# Patient Record
Sex: FEMALE
[De-identification: names, ages, dates, MRNs, and addresses within clinical notes are randomized per-mention and may not be internally consistent; named-entity substitution may affect disease eponyms.]

---

## 2024-06-20 ENCOUNTER — NON-APPOINTMENT (OUTPATIENT)
Age: 74
End: 2024-06-20

## 2024-06-20 PROBLEM — Z00.00 ENCOUNTER FOR PREVENTIVE HEALTH EXAMINATION: Status: ACTIVE | Noted: 2024-06-20

## 2024-06-24 PROBLEM — D05.11 DUCTAL CARCINOMA IN SITU (DCIS) OF RIGHT BREAST: Status: ACTIVE | Noted: 2024-06-24

## 2024-06-25 ENCOUNTER — NON-APPOINTMENT (OUTPATIENT)
Age: 74
End: 2024-06-25

## 2024-06-25 ENCOUNTER — APPOINTMENT (OUTPATIENT)
Dept: BREAST CENTER | Facility: CLINIC | Age: 74
End: 2024-06-25
Payer: MEDICARE

## 2024-06-25 VITALS
BODY MASS INDEX: 49.51 KG/M2 | HEART RATE: 68 BPM | DIASTOLIC BLOOD PRESSURE: 92 MMHG | HEIGHT: 64.17 IN | SYSTOLIC BLOOD PRESSURE: 157 MMHG | WEIGHT: 290 LBS

## 2024-06-25 DIAGNOSIS — Z86.79 PERSONAL HISTORY OF OTHER DISEASES OF THE CIRCULATORY SYSTEM: ICD-10-CM

## 2024-06-25 DIAGNOSIS — D05.11 INTRADUCTAL CARCINOMA IN SITU OF RIGHT BREAST: ICD-10-CM

## 2024-06-25 DIAGNOSIS — Z86.39 PERSONAL HISTORY OF OTHER ENDOCRINE, NUTRITIONAL AND METABOLIC DISEASE: ICD-10-CM

## 2024-06-25 DIAGNOSIS — Z80.0 FAMILY HISTORY OF MALIGNANT NEOPLASM OF DIGESTIVE ORGANS: ICD-10-CM

## 2024-06-25 PROCEDURE — 99205 OFFICE O/P NEW HI 60 MIN: CPT

## 2024-06-25 RX ORDER — ROSUVASTATIN CALCIUM 5 MG/1
TABLET, FILM COATED ORAL
Refills: 0 | Status: ACTIVE | COMMUNITY

## 2024-06-25 RX ORDER — METOPROLOL TARTRATE 75 MG/1
TABLET, FILM COATED ORAL
Refills: 0 | Status: ACTIVE | COMMUNITY

## 2024-06-28 ENCOUNTER — OUTPATIENT (OUTPATIENT)
Dept: OUTPATIENT SERVICES | Facility: HOSPITAL | Age: 74
LOS: 1 days | End: 2024-06-28
Payer: MEDICARE

## 2024-06-28 ENCOUNTER — RESULT REVIEW (OUTPATIENT)
Age: 74
End: 2024-06-28

## 2024-06-28 DIAGNOSIS — D05.11 INTRADUCTAL CARCINOMA IN SITU OF RIGHT BREAST: ICD-10-CM

## 2024-06-28 LAB — SURGICAL PATHOLOGY STUDY: SIGNIFICANT CHANGE UP

## 2024-06-28 PROCEDURE — 88321 CONSLTJ&REPRT SLD PREP ELSWR: CPT

## 2024-07-01 ENCOUNTER — NON-APPOINTMENT (OUTPATIENT)
Age: 74
End: 2024-07-01

## 2024-07-12 ENCOUNTER — NON-APPOINTMENT (OUTPATIENT)
Age: 74
End: 2024-07-12

## 2024-09-04 ENCOUNTER — TRANSCRIPTION ENCOUNTER (OUTPATIENT)
Age: 74
End: 2024-09-04

## 2024-09-04 VITALS
TEMPERATURE: 98 F | HEART RATE: 67 BPM | HEIGHT: 64 IN | OXYGEN SATURATION: 97 % | SYSTOLIC BLOOD PRESSURE: 161 MMHG | WEIGHT: 293 LBS | DIASTOLIC BLOOD PRESSURE: 89 MMHG | RESPIRATION RATE: 18 BRPM

## 2024-09-04 RX ORDER — ROSUVASTATIN CALCIUM 10 MG/1
1 TABLET ORAL
Refills: 0 | DISCHARGE

## 2024-09-04 RX ORDER — ASPIRIN 81 MG
0 TABLET, DELAYED RELEASE (ENTERIC COATED) ORAL
Refills: 0 | DISCHARGE

## 2024-09-04 NOTE — ASU PATIENT PROFILE, ADULT - FALL HARM RISK - HARM RISK INTERVENTIONS

## 2024-09-04 NOTE — ASU PATIENT PROFILE, ADULT - HISTORY OF COVID-19 VACCINATION
April 3, 2023      Fadi Calzada  [address]  [facility] [address]      Dear Fadi,      To help you live well and offer other ways to take care of your health care needs, your doctor’s practice in partnership with [facility], is offering a new program that will help you stay healthy. The program is called Comprehensive Annual Visits. Here’s why you should schedule your visit today:    Your health and wellness have never been more important! Even if you already had a visit with your doctor, this visit offers key benefits for YOU:    1. Convenience: Our Nurse Practitioner can do screenings in your home that you would have to go out to the clinic for. They will talk about important care for you and help you set health goals.     2. Health exam and vaccines:  The Nurse Practitioner will check your blood pressure, review your medications, give you needed vaccinations (flu, pneumonia & shingles) and complete preventive screenings.    3. Better manage your health and chronic conditions: The friendly Nurse Practitioner will review your medications, medical history, and talk about your health concerns and questions.    4. Take your time: The appointment is 45 minutes, allowing plenty of time to get to know your Nurse Practitioner, review your health information, and talk about your health goals.    5. Get the care you need: The Nurse Practitioner will share with us your health information, concerns, and priorities, so that we can help you get the care you need, when you need it.  Next steps    Please take advantage of this new program and reserve your spot! You will receive a call from a representative at [facility] or you can call to schedule an appointment at [phone], Monday - Friday, 8 a.m. ? 4 p.m. CST.  We can also answer any questions you may have about this appointment.      Be well!    From your doctor’s practice in partnership with [facility]  
Yes

## 2024-09-04 NOTE — ASU PATIENT PROFILE, ADULT - NSICDXPASTSURGICALHX_GEN_ALL_CORE_FT
PAST SURGICAL HISTORY:  History of bladder suspension procedure     History of hysterectomy partial

## 2024-09-04 NOTE — ASU PREOP CHECKLIST - LOOSE TEETH
Detail Level: Detailed
Add 92332 Cpt? (Important Note: In 2017 The Use Of 08389 Is Being Tracked By Cms To Determine Future Global Period Reimbursement For Global Periods): yes
no

## 2024-09-04 NOTE — ASU PATIENT PROFILE, ADULT - NSICDXPASTMEDICALHX_GEN_ALL_CORE_FT
PAST MEDICAL HISTORY:  Breast cancer right    HLD (hyperlipidemia)     HTN (hypertension)     Pneumonia 7/2024    Thyroid nodule

## 2024-09-05 ENCOUNTER — APPOINTMENT (OUTPATIENT)
Dept: BREAST CENTER | Facility: HOSPITAL | Age: 74
End: 2024-09-05

## 2024-09-05 ENCOUNTER — RESULT REVIEW (OUTPATIENT)
Age: 74
End: 2024-09-05

## 2024-09-05 ENCOUNTER — OUTPATIENT (OUTPATIENT)
Dept: OUTPATIENT SERVICES | Facility: HOSPITAL | Age: 74
LOS: 1 days | Discharge: HOME | End: 2024-09-05
Payer: MEDICARE

## 2024-09-05 VITALS
TEMPERATURE: 97 F | OXYGEN SATURATION: 97 % | DIASTOLIC BLOOD PRESSURE: 83 MMHG | RESPIRATION RATE: 18 BRPM | SYSTOLIC BLOOD PRESSURE: 146 MMHG | HEART RATE: 71 BPM

## 2024-09-05 DIAGNOSIS — Z98.890 OTHER SPECIFIED POSTPROCEDURAL STATES: Chronic | ICD-10-CM

## 2024-09-05 DIAGNOSIS — Z90.710 ACQUIRED ABSENCE OF BOTH CERVIX AND UTERUS: Chronic | ICD-10-CM

## 2024-09-05 LAB — GLUCOSE BLDC GLUCOMTR-MCNC: 114 MG/DL — HIGH (ref 70–99)

## 2024-09-05 PROCEDURE — 82962 GLUCOSE BLOOD TEST: CPT

## 2024-09-05 PROCEDURE — 19301 PARTIAL MASTECTOMY: CPT | Mod: RT

## 2024-09-05 PROCEDURE — 76098 X-RAY EXAM SURGICAL SPECIMEN: CPT | Mod: 26

## 2024-09-05 PROCEDURE — 88305 TISSUE EXAM BY PATHOLOGIST: CPT | Mod: 26

## 2024-09-05 PROCEDURE — 76098 X-RAY EXAM SURGICAL SPECIMEN: CPT

## 2024-09-05 PROCEDURE — 88307 TISSUE EXAM BY PATHOLOGIST: CPT

## 2024-09-05 PROCEDURE — 88305 TISSUE EXAM BY PATHOLOGIST: CPT

## 2024-09-05 PROCEDURE — 88307 TISSUE EXAM BY PATHOLOGIST: CPT | Mod: 26

## 2024-09-05 RX ORDER — OXYCODONE HYDROCHLORIDE 5 MG/1
5 TABLET ORAL ONCE
Refills: 0 | Status: DISCONTINUED | OUTPATIENT
Start: 2024-09-05 | End: 2024-09-05

## 2024-09-05 RX ORDER — HYDRALAZINE HCL 50 MG
5 TABLET ORAL ONCE
Refills: 0 | Status: COMPLETED | OUTPATIENT
Start: 2024-09-05 | End: 2024-09-05

## 2024-09-05 RX ORDER — ROSUVASTATIN CALCIUM 10 MG/1
1 TABLET ORAL
Refills: 0 | DISCHARGE

## 2024-09-05 RX ORDER — METOPROLOL TARTRATE 100 MG/1
1 TABLET ORAL
Refills: 0 | DISCHARGE

## 2024-09-05 RX ORDER — ACETAMINOPHEN 325 MG/1
1000 TABLET ORAL EVERY 6 HOURS
Refills: 0 | Status: DISCONTINUED | OUTPATIENT
Start: 2024-09-05 | End: 2024-09-05

## 2024-09-05 RX ADMIN — ACETAMINOPHEN 1000 MILLIGRAM(S): 325 TABLET ORAL at 12:33

## 2024-09-05 RX ADMIN — Medication 5 MILLIGRAM(S): at 11:54

## 2024-09-05 RX ADMIN — ACETAMINOPHEN 1000 MILLIGRAM(S): 325 TABLET ORAL at 12:44

## 2024-09-05 NOTE — PRE-ANESTHESIA EVALUATION ADULT - NSANTHOSAYNRD_GEN_A_CORE
No. SUMMER screening performed.  STOP BANG Legend: 0-2 = LOW Risk; 3-4 = INTERMEDIATE Risk; 5-8 = HIGH Risk tried twice in past 5 days

## 2024-09-05 NOTE — ASU DISCHARGE PLAN (ADULT/PEDIATRIC) - CARE PROVIDER_API CALL
Jovan Whitney Ponce  Surgery  [address] [place] [id number]  Phone: [phone]  Fax: [phone]  Follow Up Time:

## 2024-09-05 NOTE — ASU DISCHARGE PLAN (ADULT/PEDIATRIC) - ASU DC SPECIAL INSTRUCTIONSFT
Follow up with Dr. Whitney in 1-2 weeks. Call the office at the number below to schedule your appointment. You may shower after 2 days; soap and water over incision sites. Do not scrub. Pat dry when done. No tub bathing or swimming until cleared. Keep incision sites out of the sun as scars will darken. Ambulate as tolerated, but no heavy lifting (>10lbs) or strenuous exercise. You may resume regular diet. You should be urinating at least 3-4x per day. Call the office if you experience increasing abdominal pain, nausea, vomiting, or temperature >101 F.    Please keep ACE bandage compression on for 2 days. After 2 days you may remove ACE bandage, but do not remove Steri-strips.    Please take Tylenol or Motrin every six hours as need for pain.

## 2024-09-05 NOTE — PROGRESS NOTE ADULT - SUBJECTIVE AND OBJECTIVE BOX
Procedure: Right breast lumpectomy  Surgeon: Dr. Whitney    S: Pt has no complaints. Denies CP, SOB, GARCIA, calf tenderness. Pain controlled with medication.    O:  T(C): 36.1 (09-05-24 @ 09:58), Max: 36.1 (09-05-24 @ 09:58)  T(F): 97 (09-05-24 @ 09:58), Max: 97 (09-05-24 @ 09:58)  HR: 65 (09-05-24 @ 11:46) (57 - 65)  BP: 173/97 (09-05-24 @ 11:46) (158/79 - 174/81)  RR: 18 (09-05-24 @ 11:46) (15 - 24)  SpO2: 97% (09-05-24 @ 11:46) (92% - 98%)  Wt(kg): --      Physical exam:  Gen: NAD, resting comfortably in chair  C/V: NSR  Breast: bandage in place, no swelling, TTP, or overlying skin changes nearby  Pulm: Nonlabored breathing, no respiratory distress  Abd: soft, NT/ND  Extrem: WWP, no calf edema, SCDs in place

## 2024-09-05 NOTE — BRIEF OPERATIVE NOTE - NSICDXBRIEFPREOP_GEN_ALL_CORE_FT
PRE-OP DIAGNOSIS:  Ductal carcinoma in situ (DCIS) of right breast 05-Sep-2024 09:59:16  Alejo Fofana

## 2024-09-05 NOTE — PROGRESS NOTE ADULT - ASSESSMENT
A/P: 74yFemale s/p R breast lumpectomy. Recovering well post-op. Post-op check within normal limits.    dc home today  tylenol/advil for pain

## 2024-09-05 NOTE — BRIEF OPERATIVE NOTE - OPERATION/FINDINGS
Right periareolar incision. Subcutaneous tissue dissected with electrocautery. Magseed localizer utilized and specimen taken from with confirmation of both clips with intraop xray. Additional shave margins taken. Hemostasis achieved. Tissue rearrangement completed and tissue closed in layers with 2.0 vicryl, 4.0 vicryl, and 5.0 monocryl.

## 2024-09-05 NOTE — BRIEF OPERATIVE NOTE - NSICDXBRIEFPOSTOP_GEN_ALL_CORE_FT
POST-OP DIAGNOSIS:  Ductal carcinoma in situ (DCIS) of right breast 05-Sep-2024 09:59:20  Alejo Fofana

## 2024-09-08 NOTE — HISTORY OF PRESENT ILLNESS
[de-identified] : 74 year old Faith female was referred by Dr Whitney regarding newly diagnosed R DCIS , intermediate nuclear grade, cribriform pattern with calcifications; ER >90% CA >90%; malignant; concordant; seen as 0.8cm RIGHT breast 5:00 1cmfn mass initially seen on screening imaging; biopsied via US   4/17/24 ([facility]) B/L sMMG/US: scattered areas of fbg density. Right 6:00, retroareolar questioned mass for which diagnostic mammography and targeted sonography would be recommended. If older outside breast imaging is made available, comparison should be requested, which may obviate the need for additional imaging. FOLLOW-UP: Additional imaging. BI-RADS 0: Incomplete.  5/16/24 ([facility]) R DX MMG/US: scattered areas of fbg density. MAMMO: Right 5:00 to 6:00 2cmfn persistent 0.9 cm equal density mass. US: 5:00 1cmfn cystic mass containing intracystic solid material 0.8 x 0.5 x 0.8 cm, corresponding to mammographic finding IMPRESSION: Right 5:00 mass corresponding to mammographic finding for which ultrasound-guided biopsy is recommended. If older outside breast imaging is made available, comparison should be requested. FOLLOW-UP: R Ultrasound guided biopsy. BI-RADS 4: Suspicious.  5/28/24 ([facility] Path) US-guided biopsy of RIGHT breast 5:00 1cmfn location measuring 0.8 x 0.5 x 0.6 cm (Venus marker): DCIS, intermediate nuclear grade, cribriform pattern with calcifications; ER >90% CA >90%; malignant; concordant. RECOMMENDAITON: Breast surgical consultation for definitive management. Presurgical breast MRI would be suggested.     Patient with allergy to penicillins. Patient's BMI is 49.5  Past Medical History History of high cholesterol  History of hypertension    Menstrual Hx: has no history of hormone replacement treatment. age at menarche was 17. age at menopause was unknown.   Prior pregnancies: G16 and P14 . Age at live birth: 23   Fertility Treatments: no.   Birth Control Pill Use: no.   Breast Feeding History: yes GYN BMD Colonoscopy     Surgical History History of Hysterectomy (V88.01)      Family History Myriad neg Family history of liver cancer (V16.0) (Z80.0) : Mother Twin Brother: Unknown Cancer Mother: Liver Cancer @54 No hx breast, ovarian or panc ca Pt has 14 childre  Social History

## 2024-09-11 LAB — SURGICAL PATHOLOGY STUDY: SIGNIFICANT CHANGE UP

## 2024-09-17 ENCOUNTER — APPOINTMENT (OUTPATIENT)
Dept: HEMATOLOGY ONCOLOGY | Facility: CLINIC | Age: 74
End: 2024-09-17
Payer: MEDICARE

## 2024-09-17 ENCOUNTER — APPOINTMENT (OUTPATIENT)
Dept: BREAST CENTER | Facility: CLINIC | Age: 74
End: 2024-09-17
Payer: MEDICARE

## 2024-09-17 ENCOUNTER — NON-APPOINTMENT (OUTPATIENT)
Age: 74
End: 2024-09-17

## 2024-09-17 VITALS
HEART RATE: 64 BPM | WEIGHT: 288 LBS | DIASTOLIC BLOOD PRESSURE: 91 MMHG | HEIGHT: 64.17 IN | SYSTOLIC BLOOD PRESSURE: 168 MMHG | BODY MASS INDEX: 49.17 KG/M2

## 2024-09-17 VITALS
DIASTOLIC BLOOD PRESSURE: 91 MMHG | TEMPERATURE: 98.7 F | SYSTOLIC BLOOD PRESSURE: 173 MMHG | OXYGEN SATURATION: 97 % | WEIGHT: 293 LBS | HEIGHT: 64 IN | BODY MASS INDEX: 50.02 KG/M2 | HEART RATE: 80 BPM

## 2024-09-17 DIAGNOSIS — E66.9 OBESITY, UNSPECIFIED: ICD-10-CM

## 2024-09-17 DIAGNOSIS — Z86.39 PERSONAL HISTORY OF OTHER ENDOCRINE, NUTRITIONAL AND METABOLIC DISEASE: ICD-10-CM

## 2024-09-17 DIAGNOSIS — D05.11 INTRADUCTAL CARCINOMA IN SITU OF RIGHT BREAST: ICD-10-CM

## 2024-09-17 DIAGNOSIS — Z86.79 PERSONAL HISTORY OF OTHER DISEASES OF THE CIRCULATORY SYSTEM: ICD-10-CM

## 2024-09-17 PROBLEM — J18.9 PNEUMONIA, UNSPECIFIED ORGANISM: Chronic | Status: ACTIVE | Noted: 2024-09-04

## 2024-09-17 PROBLEM — I10 ESSENTIAL (PRIMARY) HYPERTENSION: Chronic | Status: ACTIVE | Noted: 2024-09-04

## 2024-09-17 PROBLEM — C50.919 MALIGNANT NEOPLASM OF UNSPECIFIED SITE OF UNSPECIFIED FEMALE BREAST: Chronic | Status: ACTIVE | Noted: 2024-09-04

## 2024-09-17 PROBLEM — E78.5 HYPERLIPIDEMIA, UNSPECIFIED: Chronic | Status: ACTIVE | Noted: 2024-09-04

## 2024-09-17 PROBLEM — E04.1 NONTOXIC SINGLE THYROID NODULE: Chronic | Status: ACTIVE | Noted: 2024-09-04

## 2024-09-17 PROCEDURE — 99024 POSTOP FOLLOW-UP VISIT: CPT

## 2024-09-17 PROCEDURE — 99205 OFFICE O/P NEW HI 60 MIN: CPT

## 2024-09-17 RX ORDER — TAMOXIFEN CITRATE 20 MG/1
20 TABLET, FILM COATED ORAL
Qty: 90 | Refills: 2 | Status: ACTIVE | COMMUNITY
Start: 2024-09-17 | End: 1900-01-01

## 2024-09-17 RX ORDER — ASPIRIN 81 MG/1
81 TABLET, COATED ORAL DAILY
Refills: 0 | Status: ACTIVE | COMMUNITY
Start: 2024-09-17

## 2024-09-17 NOTE — ASSESSMENT
[FreeTextEntry1] : 74 year old Shinto female, initially referred by [facility], presents for post-op evaluation s/p (surgery) on 9/5/24 of R DCIS , intermediate nuclear grade, cribriform pattern with calcifications; ER >90% NM >90%; malignant and concordant; initially seen on screening imaging as a 0.8cm mass R 5:00 1cmfn; biopsied via US guidance.  Final surgical pathology yielded Ductal carcinoma in situ, low to intermediate grade, cribriform type, associated calcifications, 20 mm, less than 1 mm from inked inferior and medial margins, 1.5 mm from inked posterior margin and at biopsy site changes; final margins clear; Uninvolved breast shows fibrocystic changes and vascular calcifications.  Myriad genetic testing 6/25/24 negative.  Patient is doing well post-operatively. Suture ends were cut and steri-strips were reapplied. Will order DCisionRT to assess for patient's recurrence risk, patient to see radiation oncologist to discuss results and plan whether to proceed with radiation. Patient to also see medical oncologist. Patient met with nurse navigator Olivia in office today. Plan for B/L DX MMG/US and re-examination in April 2025. Patient verbalizes understanding and is in agreement with the plan.

## 2024-09-17 NOTE — PHYSICAL EXAM
[Supple] : supple [Examined in the supine and seated position] : examined in the supine and seated position [No dominant masses] : no dominant masses in right breast  [No dominant masses] : no dominant masses left breast [No Nipple Retraction] : no left nipple retraction [No Nipple Discharge] : no left nipple discharge [No Axillary Lymphadenopathy] : no left axillary lymphadenopathy [de-identified] : incisions c/d/i

## 2024-09-17 NOTE — HISTORY OF PRESENT ILLNESS
[FreeTextEntry1] : 74 year old Islam female, initially referred by [facility], presents for post-op evaluation s/p right localized lumpectomy on 9/5/24 of R DCIS intermediate nuclear grade, cribriform pattern with calcifications; ER >90% DC >90%; malignant and concordant; initially seen on screening imaging as a 0.8cm mass R 5:00 1cmfn; biopsied via US guidance.  Final surgical pathology yielded low to intermediate grade DCIS, cribriform type, 20 mm; less than 1 mm from inked inferior and medial margins, 1.5 mm from inked posterior margin and at biopsy site changes; final margins clear; Uninvolved breast shows fibrocystic changes and vascular calcifications.  Denies any fever, chills, redness, pain, or discharge at the incision site.  Patient denies family history of breast or ovarian cancer.  [employer] genetic testing on 6/25/24, negative for pathogenic mutations. Patient has 14 children.   Patient reports history of HTN, HLD. Patient with allergy to penicillins. Patient's BMI is 49.5.

## 2024-09-17 NOTE — HISTORY OF PRESENT ILLNESS
[FreeTextEntry1] : 74 year old Orthodoxy female, initially referred by [facility], presents for post-op evaluation s/p right localized lumpectomy on 9/5/24 of R DCIS intermediate nuclear grade, cribriform pattern with calcifications; ER >90% IA >90%; malignant and concordant; initially seen on screening imaging as a 0.8cm mass R 5:00 1cmfn; biopsied via US guidance.  Final surgical pathology yielded low to intermediate grade DCIS, cribriform type, 20 mm; less than 1 mm from inked inferior and medial margins, 1.5 mm from inked posterior margin and at biopsy site changes; final margins clear; Uninvolved breast shows fibrocystic changes and vascular calcifications.  Denies any fever, chills, redness, pain, or discharge at the incision site.  Patient denies family history of breast or ovarian cancer.  [employer] genetic testing on 6/25/24, negative for pathogenic mutations. Patient has 14 children.   Patient reports history of HTN, HLD. Patient with allergy to penicillins. Patient's BMI is 49.5.

## 2024-09-17 NOTE — ASSESSMENT
[FreeTextEntry1] : 74 year old Sabian[place] female, initially referred by [facility], presents for post-op evaluation s/p (surgery) on 9/5/24 of R DCIS , intermediate nuclear grade, cribriform pattern with calcifications; ER >90% [place] >90%; malignant and concordant; initially seen on screening imaging as a 0.8cm mass R 5:00 1cmfn; biopsied via US guidance.  Final surgical pathology yielded Ductal carcinoma in situ, low to intermediate grade, cribriform type, associated calcifications, 20 mm, less than 1 mm from inked inferior and medial margins, 1.5 mm from inked posterior margin and at biopsy site changes; final margins clear; Uninvolved breast shows fibrocystic changes and vascular calcifications.  Myriad genetic testing 6/25/24 negative.  Patient is doing well post-operatively. Suture ends were cut and steri-strips were reapplied. Will order DCisionRT to assess for patient's recurrence risk, patient to see radiation oncologist to discuss results and plan whether to proceed with radiation. Patient to also see medical oncologist. Patient met with nurse navigator Olivia in office today. Plan for B/L DX MMG/US and re-examination in April 2025. Patient verbalizes understanding and is in agreement with the plan.

## 2024-09-17 NOTE — PHYSICAL EXAM
[Supple] : supple [Examined in the supine and seated position] : examined in the supine and seated position [No dominant masses] : no dominant masses in right breast  [No dominant masses] : no dominant masses left breast [No Nipple Retraction] : no left nipple retraction [No Nipple Discharge] : no left nipple discharge [No Axillary Lymphadenopathy] : no left axillary lymphadenopathy [de-identified] : incisions c/d/i

## 2024-09-17 NOTE — DATA REVIEWED
[FreeTextEntry1] : 4/17/24 (R) B/L sMMG/US: scattered areas of fbg density.  Right 6:00, retroareolar questioned mass for which diagnostic mammography and targeted sonography would be recommended. If older outside breast imaging is made available, comparison should be requested, which may obviate the need for additional imaging.  FOLLOW-UP: Additional imaging. BI-RADS 0:  Incomplete.  5/16/24 (R) R DX MMG/US: scattered areas of fbg density. MAMMO: Right 5:00 to 6:00 2cmfn persistent 0.9 cm equal density mass. US: 5:00 1cmfn cystic mass containing intracystic solid material 0.8 x 0.5 x 0.8 cm, corresponding to mammographic finding  IMPRESSION:  Right 5:00 mass corresponding to mammographic finding for which ultrasound-guided biopsy is recommended. If older outside breast imaging is made available, comparison should be requested. FOLLOW-UP:  R Ultrasound guided biopsy. BI-RADS 4:  Suspicious.  5/28/24 ([facility] Path) US-guided biopsy of RIGHT breast 5:00 1cmfn location measuring 0.8 x 0.5 x 0.6 cm (Venus marker): DCIS, intermediate nuclear grade, cribriform pattern with calcifications; ER >90% VA >90%; malignant; concordant. RECOMMENDAITON: Breast surgical consultation for definitive management. Presurgical breast MRI would be suggested.   05/28/2024 ([facility] Path) Outside review slides- US biopsy R 5:00 1cmfn: Ductal carcinoma in situ, intermediate grade, cribriform type, with associated calcifications. Submitted IHC stains show positive staining for ER and VA (both 3+90%).  9/5/24 ([facility] Path) Final surgical path: Ductal carcinoma in situ, low to intermediate grade, cribriform type, associated calcifications, 20 mm, less than 1 mm from inked inferior and medial margins, 1.5 mm from inked posterior margin and at biopsy site changes. Uninvolved breast shows fibrocystic changes and vascular calcifications.

## 2024-09-17 NOTE — REASON FOR VISIT
[Post Op: _________] : a [unfilled] post op visit [FreeTextEntry1] : R ROXANA 9/5/24  [FreeTextEntry2] : 9/5/24

## 2024-09-17 NOTE — DATA REVIEWED
[FreeTextEntry1] : 4/17/24 (R) B/L sMMG/US: scattered areas of fbg density.  Right 6:00, retroareolar questioned mass for which diagnostic mammography and targeted sonography would be recommended. If older outside breast imaging is made available, comparison should be requested, which may obviate the need for additional imaging.  FOLLOW-UP: Additional imaging. BI-RADS 0:  Incomplete.  5/16/24 (R) R DX MMG/US: scattered areas of fbg density. MAMMO: Right 5:00 to 6:00 2cmfn persistent 0.9 cm equal density mass. US: 5:00 1cmfn cystic mass containing intracystic solid material 0.8 x 0.5 x 0.8 cm, corresponding to mammographic finding  IMPRESSION:  Right 5:00 mass corresponding to mammographic finding for which ultrasound-guided biopsy is recommended. If older outside breast imaging is made available, comparison should be requested. FOLLOW-UP:  R Ultrasound guided biopsy. BI-RADS 4:  Suspicious.  5/28/24 ([facility] Path) US-guided biopsy of RIGHT breast 5:00 1cmfn location measuring 0.8 x 0.5 x 0.6 cm (Venus marker): DCIS, intermediate nuclear grade, cribriform pattern with calcifications; ER >90% CO >90%; malignant; concordant. RECOMMENDAITON: Breast surgical consultation for definitive management. Presurgical breast MRI would be suggested.   05/28/2024 ([facility] Path) Outside review slides- US biopsy R 5:00 1cmfn: Ductal carcinoma in situ, intermediate grade, cribriform type, with associated calcifications. Submitted IHC stains show positive staining for ER and CO (both 3+90%).  9/5/24 ([facility] Path) Final surgical path: Ductal carcinoma in situ, low to intermediate grade, cribriform type, associated calcifications, 20 mm, less than 1 mm from inked inferior and medial margins, 1.5 mm from inked posterior margin and at biopsy site changes. Uninvolved breast shows fibrocystic changes and vascular calcifications.

## 2024-09-22 NOTE — ASSESSMENT
[FreeTextEntry1] : Myriad neg, 73 yo s/p Rt wle for Ductal carcinoma in situ, low to intermediate grade, cribriform type, associated calcifications, present on 5 contiguous slices, 20 mm, ER/MD +. Reviewed path and prognosis. Decison  RT pending. If no RT uncertain pt will be compliant with anti- E chemo prevention - Reviewed  vs Isaacs vs Raloxifen. Pt has diffuse jt c/o - no AI. Recommend Isaacs 20mg/d - s/p Hysterectomy - if cannot tolerate then Baby Isaacs 5mg.  My recommendation is TAMOXIFEN to prevent invasive breast cancer. Potential risks associated with TAMOXIFEN include but are not limited to hot flashes, thromboembolism, cataracts and uterine cancer. Wt reduction and inc exercise strongly encouraged. BMD, F/u 6 months 
[FreeTextEntry1] : Myriad neg, 73 yo s/p Rt wle for Ductal carcinoma in situ, low to intermediate grade, cribriform type, associated calcifications, present on 5 contiguous slices, 20 mm, ER/MT +. Reviewed path and prognosis. Decison  RT pending. If no RT uncertain pt will be compliant with anti- E chemo prevention - Reviewed  vs Isaacs vs Raloxifen. Pt has diffuse jt c/o - no AI. Recommend Isaacs 20mg/d - s/p Hysterectomy - if cannot tolerate then Baby Isaacs 5mg.  My recommendation is TAMOXIFEN to prevent invasive breast cancer. Potential risks associated with TAMOXIFEN include but are not limited to hot flashes, thromboembolism, cataracts and uterine cancer. Wt reduction and inc exercise strongly encouraged. BMD, F/u 6 months 
[FreeTextEntry1] : Myriad neg, 73 yo s/p Rt wle for Ductal carcinoma in situ, low to intermediate grade, cribriform type, associated calcifications, present on 5 contiguous slices, 20 mm, ER/[place] +. Reviewed path and prognosis. Decison  RT pending. If no RT uncertain pt will be compliant with anti- E chemo prevention - Reviewed  vs Isaacs vs Raloxifen. Pt has diffuse jt c/o - no AI. Recommend Isaacs 20mg/d - s/p Hysterectomy - if cannot tolerate then Baby Isaacs 5mg.  My recommendation is TAMOXIFEN to prevent invasive breast cancer. Potential risks associated with TAMOXIFEN include but are not limited to hot flashes, thromboembolism, cataracts and uterine cancer. Wt reduction and inc exercise strongly encouraged. BMD, F/u 6 months 
[FreeTextEntry1] : Myriad neg, 73 yo s/p Rt wle for Ductal carcinoma in situ, low to intermediate grade, cribriform type, associated calcifications, present on 5 contiguous slices, 20 mm, ER/NV +. Reviewed path and prognosis. Decison  RT pending. If no RT uncertain pt will be compliant with anti- E chemo prevention - Reviewed  vs Isaacs vs Raloxifen. Pt has diffuse jt c/o - no AI. Recommend Isaacs 20mg/d - s/p Hysterectomy - if cannot tolerate then Baby Isaacs 5mg.  My recommendation is TAMOXIFEN to prevent invasive breast cancer. Potential risks associated with TAMOXIFEN include but are not limited to hot flashes, thromboembolism, cataracts and uterine cancer. Wt reduction and inc exercise strongly encouraged. BMD, F/u 6 months 
[FreeTextEntry1] : Myriad neg, 75 yo s/p Rt wle for Ductal carcinoma in situ, low to intermediate grade, cribriform type, associated calcifications, present on 5 contiguous slices, 20 mm, ER/DE +. Reviewed path and prognosis. Decison  RT pending. If no RT uncertain pt will be compliant with anti- E chemo prevention - Reviewed  vs Isaacs vs Raloxifen. Pt has diffuse jt c/o - no AI. Recommend Isaacs 20mg/d - s/p Hysterectomy - if cannot tolerate then Baby Isaacs 5mg.  My recommendation is TAMOXIFEN to prevent invasive breast cancer. Potential risks associated with TAMOXIFEN include but are not limited to hot flashes, thromboembolism, cataracts and uterine cancer. Wt reduction and inc exercise strongly encouraged. BMD, F/u 6 months 
[FreeTextEntry1] : Myriad neg, 75 yo s/p Rt wle for Ductal carcinoma in situ, low to intermediate grade, cribriform type, associated calcifications, present on 5 contiguous slices, 20 mm, ER/LA +. Reviewed path and prognosis. Decison  RT pending. If no RT uncertain pt will be compliant with anti- E chemo prevention - Reviewed  vs Isaacs vs Raloxifen. Pt has diffuse jt c/o - no AI. Recommend Isaacs 20mg/d - s/p Hysterectomy - if cannot tolerate then Baby Isaacs 5mg.  My recommendation is TAMOXIFEN to prevent invasive breast cancer. Potential risks associated with TAMOXIFEN include but are not limited to hot flashes, thromboembolism, cataracts and uterine cancer. Wt reduction and inc exercise strongly encouraged. BMD, F/u 6 months 
[FreeTextEntry1] : Myriad neg, 75 yo s/p Rt wle for Ductal carcinoma in situ, low to intermediate grade, cribriform type, associated calcifications, present on 5 contiguous slices, 20 mm, ER/OR +. Reviewed path and prognosis. Decison  RT pending. If no RT uncertain pt will be compliant with anti- E chemo prevention - Reviewed  vs Isaacs vs Raloxifen. Pt has diffuse jt c/o - no AI. Recommend Isaacs 20mg/d - s/p Hysterectomy - if cannot tolerate then Baby Isaacs 5mg.  My recommendation is TAMOXIFEN to prevent invasive breast cancer. Potential risks associated with TAMOXIFEN include but are not limited to hot flashes, thromboembolism, cataracts and uterine cancer. Wt reduction and inc exercise strongly encouraged. BMD, F/u 6 months 
Vandana

## 2024-09-22 NOTE — HISTORY OF PRESENT ILLNESS
[de-identified] : 74 year old Lutheran female was referred by Dr Whitney, presents with friend Rocío regarding newly diagnosed R DCIS , intermediate nuclear grade, cribriform pattern with calcifications; ER >90% SD >90%; malignant; concordant; seen as 0.8cm RIGHT breast 5:00 1cmfn mass initially seen on screening imaging; biopsied via US   4/17/24 ([facility]) B/L sMMG/US: scattered areas of fbg density. Right 6:00, retroareolar questioned mass for which diagnostic mammography and targeted sonography would be recommended. If older outside breast imaging is made available, comparison should be requested, which may obviate the need for additional imaging. FOLLOW-UP: Additional imaging. BI-RADS 0: Incomplete.  5/16/24 ([facility]) R DX MMG/US: scattered areas of fbg density. MAMMO: Right 5:00 to 6:00 2cmfn persistent 0.9 cm equal density mass. US: 5:00 1cmfn cystic mass containing intracystic solid material 0.8 x 0.5 x 0.8 cm, corresponding to mammographic finding IMPRESSION: Right 5:00 mass corresponding to mammographic finding for which ultrasound-guided biopsy is recommended. If older outside breast imaging is made available, comparison should be requested. FOLLOW-UP: R Ultrasound guided biopsy. BI-RADS 4: Suspicious.  5/28/24 ([facility]) US-guided biopsy of RIGHT breast 5:00 1cmfn location measuring 0.8 x 0.5 x 0.6 cm (Venus marker): DCIS, intermediate nuclear grade, cribriform pattern with calcifications; ER >90% SD >90%; malignant; concordant. RECOMMENDAITON: Breast surgical consultation for definitive management. Presurgical breast MRI would be suggested.   9/5/24Right breast, lumpectomy: -   Ductal carcinoma in situ, low to intermediate grade, cribriform type, associated calcifications, present on 5 contiguous slices (20 mm), less than 1 mm from inked inferior and medial margins, 1.5 mm from inked posterior margin and at biopsy site changes. For final margins, see below. -   Uninvolved breast shows fibrocystic changes and vascular calcifications. 2  Lateral margin clip marks, new lateral margin: -   Benign fibrofatty breast, negative for tumor. 3  Superior margin clip marks, new superior margin: -   Fibrocystic changes, negative for tumor. 4  Medial margin clip marks, new medial margin: -   Benign fibrofatty breast, negative for tumor. 5  Anterior margin clip marks, new anterior margin: -   Benign fibrofatty breast, negative for tumor. 6  Inferior margin clip marks, new inferior margin: -   Benign fibrofatty breast, negative for tumor. -   Vascular calcifications. 7  Deep margin clip marks, new deep margin: -   Ductal carcinoma in situ, similar to part 1, 4 mm in greatest dimension, present less than 1 mm from inked new margin (1 mm focus). -   Fibrocystic changes.   Decision RT sent  Patient with allergy to penicillins. Patient's BMI is 49.5  Past Medical History History of high cholesterol  History of hypertension    Menstrual Hx: has no history of hormone replacement treatment. age at menarche was 17. age at menopause 50's    Prior pregnancies: G16 and P14 . Age at live birth: 23   Fertility Treatments: no.   Birth Control Pill Use: no.   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

## 2024-09-22 NOTE — HISTORY OF PRESENT ILLNESS
[de-identified] : 74 year old Jew female was referred by Dr Whitney, presents with friend Rocío regarding newly diagnosed R DCIS , intermediate nuclear grade, cribriform pattern with calcifications; ER >90% IL >90%; malignant; concordant; seen as 0.8cm RIGHT breast 5:00 1cmfn mass initially seen on screening imaging; biopsied via US   4/17/24 ([facility]) B/L sMMG/US: scattered areas of fbg density. Right 6:00, retroareolar questioned mass for which diagnostic mammography and targeted sonography would be recommended. If older outside breast imaging is made available, comparison should be requested, which may obviate the need for additional imaging. FOLLOW-UP: Additional imaging. BI-RADS 0: Incomplete.  5/16/24 ([facility]) R DX MMG/US: scattered areas of fbg density. MAMMO: Right 5:00 to 6:00 2cmfn persistent 0.9 cm equal density mass. US: 5:00 1cmfn cystic mass containing intracystic solid material 0.8 x 0.5 x 0.8 cm, corresponding to mammographic finding IMPRESSION: Right 5:00 mass corresponding to mammographic finding for which ultrasound-guided biopsy is recommended. If older outside breast imaging is made available, comparison should be requested. FOLLOW-UP: R Ultrasound guided biopsy. BI-RADS 4: Suspicious.  5/28/24 ([facility]) US-guided biopsy of RIGHT breast 5:00 1cmfn location measuring 0.8 x 0.5 x 0.6 cm (Venus marker): DCIS, intermediate nuclear grade, cribriform pattern with calcifications; ER >90% IL >90%; malignant; concordant. RECOMMENDAITON: Breast surgical consultation for definitive management. Presurgical breast MRI would be suggested.   9/5/24Right breast, lumpectomy: -   Ductal carcinoma in situ, low to intermediate grade, cribriform type, associated calcifications, present on 5 contiguous slices (20 mm), less than 1 mm from inked inferior and medial margins, 1.5 mm from inked posterior margin and at biopsy site changes. For final margins, see below. -   Uninvolved breast shows fibrocystic changes and vascular calcifications. 2  Lateral margin clip marks, new lateral margin: -   Benign fibrofatty breast, negative for tumor. 3  Superior margin clip marks, new superior margin: -   Fibrocystic changes, negative for tumor. 4  Medial margin clip marks, new medial margin: -   Benign fibrofatty breast, negative for tumor. 5  Anterior margin clip marks, new anterior margin: -   Benign fibrofatty breast, negative for tumor. 6  Inferior margin clip marks, new inferior margin: -   Benign fibrofatty breast, negative for tumor. -   Vascular calcifications. 7  Deep margin clip marks, new deep margin: -   Ductal carcinoma in situ, similar to part 1, 4 mm in greatest dimension, present less than 1 mm from inked new margin (1 mm focus). -   Fibrocystic changes.   Decision RT sent  Patient with allergy to penicillins. Patient's BMI is 49.5  Past Medical History History of high cholesterol  History of hypertension    Menstrual Hx: has no history of hormone replacement treatment. age at menarche was 17. age at menopause 50's    Prior pregnancies: G16 and P14 . Age at live birth: 23   Fertility Treatments: no.   Birth Control Pill Use: no.   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

## 2024-09-22 NOTE — PHYSICAL EXAM
[Restricted in physically strenuous activity but ambulatory and able to carry out work of a light or sedentary nature] : Status 1- Restricted in physically strenuous activity but ambulatory and able to carry out work of a light or sedentary nature, e.g., light house work, office work [Obese] : obese [Normal] : affect appropriate [de-identified] : Rt WLE, Lt unremarkable, biat pendulous

## 2024-09-22 NOTE — PHYSICAL EXAM
[Restricted in physically strenuous activity but ambulatory and able to carry out work of a light or sedentary nature] : Status 1- Restricted in physically strenuous activity but ambulatory and able to carry out work of a light or sedentary nature, e.g., light house work, office work [Obese] : obese [Normal] : affect appropriate [de-identified] : Rt WLE, Lt unremarkable, biat pendulous

## 2024-09-22 NOTE — PHYSICAL EXAM
[Restricted in physically strenuous activity but ambulatory and able to carry out work of a light or sedentary nature] : Status 1- Restricted in physically strenuous activity but ambulatory and able to carry out work of a light or sedentary nature, e.g., light house work, office work [Obese] : obese [Normal] : affect appropriate [de-identified] : Rt WLE, Lt unremarkable, biat pendulous

## 2024-09-22 NOTE — PHYSICAL EXAM
[Restricted in physically strenuous activity but ambulatory and able to carry out work of a light or sedentary nature] : Status 1- Restricted in physically strenuous activity but ambulatory and able to carry out work of a light or sedentary nature, e.g., light house work, office work [Obese] : obese [Normal] : affect appropriate [de-identified] : Rt WLE, Lt unremarkable, biat pendulous

## 2024-09-22 NOTE — REVIEW OF SYSTEMS
[Lower Ext Edema] : lower extremity edema [Shortness Of Breath] : no shortness of breath [Wheezing] : no wheezing [Cough] : cough [SOB on Exertion] : no shortness of breath during exertion [Diarrhea: Grade 0] : Diarrhea: Grade 0 [Joint Pain] : joint pain [Negative] : Allergic/Immunologic [FreeTextEntry6] : cough with occ sputum [FreeTextEntry9] : diffuse

## 2024-09-22 NOTE — HISTORY OF PRESENT ILLNESS
[de-identified] : 74 year old Alevism female was referred by Dr Whitney, presents with friend Rocío regarding newly diagnosed R DCIS , intermediate nuclear grade, cribriform pattern with calcifications; ER >90% CT >90%; malignant; concordant; seen as 0.8cm RIGHT breast 5:00 1cmfn mass initially seen on screening imaging; biopsied via US   4/17/24 ([facility]) B/L sMMG/US: scattered areas of fbg density. Right 6:00, retroareolar questioned mass for which diagnostic mammography and targeted sonography would be recommended. If older outside breast imaging is made available, comparison should be requested, which may obviate the need for additional imaging. FOLLOW-UP: Additional imaging. BI-RADS 0: Incomplete.  5/16/24 ([facility]) R DX MMG/US: scattered areas of fbg density. MAMMO: Right 5:00 to 6:00 2cmfn persistent 0.9 cm equal density mass. US: 5:00 1cmfn cystic mass containing intracystic solid material 0.8 x 0.5 x 0.8 cm, corresponding to mammographic finding IMPRESSION: Right 5:00 mass corresponding to mammographic finding for which ultrasound-guided biopsy is recommended. If older outside breast imaging is made available, comparison should be requested. FOLLOW-UP: R Ultrasound guided biopsy. BI-RADS 4: Suspicious.  5/28/24 ([facility]) US-guided biopsy of RIGHT breast 5:00 1cmfn location measuring 0.8 x 0.5 x 0.6 cm (Venus marker): DCIS, intermediate nuclear grade, cribriform pattern with calcifications; ER >90% CT >90%; malignant; concordant. RECOMMENDAITON: Breast surgical consultation for definitive management. Presurgical breast MRI would be suggested.   9/5/24Right breast, lumpectomy: -   Ductal carcinoma in situ, low to intermediate grade, cribriform type, associated calcifications, present on 5 contiguous slices (20 mm), less than 1 mm from inked inferior and medial margins, 1.5 mm from inked posterior margin and at biopsy site changes. For final margins, see below. -   Uninvolved breast shows fibrocystic changes and vascular calcifications. 2  Lateral margin clip marks, new lateral margin: -   Benign fibrofatty breast, negative for tumor. 3  Superior margin clip marks, new superior margin: -   Fibrocystic changes, negative for tumor. 4  Medial margin clip marks, new medial margin: -   Benign fibrofatty breast, negative for tumor. 5  Anterior margin clip marks, new anterior margin: -   Benign fibrofatty breast, negative for tumor. 6  Inferior margin clip marks, new inferior margin: -   Benign fibrofatty breast, negative for tumor. -   Vascular calcifications. 7  Deep margin clip marks, new deep margin: -   Ductal carcinoma in situ, similar to part 1, 4 mm in greatest dimension, present less than 1 mm from inked new margin (1 mm focus). -   Fibrocystic changes.   Decision RT sent  Patient with allergy to penicillins. Patient's BMI is 49.5  Past Medical History History of high cholesterol  History of hypertension    Menstrual Hx: has no history of hormone replacement treatment. age at menarche was 17. age at menopause 50's    Prior pregnancies: G16 and P14 . Age at live birth: 23   Fertility Treatments: no.   Birth Control Pill Use: no.   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

## 2024-09-22 NOTE — HISTORY OF PRESENT ILLNESS
[de-identified] : 74 year old Hinduism female was referred by Dr Whitney, presents with friend Rocío regarding newly diagnosed R DCIS , intermediate nuclear grade, cribriform pattern with calcifications; ER >90% [place] >90%; malignant; concordant; seen as 0.8cm RIGHT breast 5:00 1cmfn mass initially seen on screening imaging; biopsied via US   4/17/24 ([facility]) B/L sMMG/US: scattered areas of fbg density. Right 6:00, retroareolar questioned mass for which diagnostic mammography and targeted sonography would be recommended. If older outside breast imaging is made available, comparison should be requested, which may obviate the need for additional imaging. FOLLOW-UP: Additional imaging. BI-RADS 0: Incomplete.  5/16/24 ([facility]) R DX MMG/US: scattered areas of fbg density. MAMMO: Right 5:00 to 6:00 2cmfn persistent 0.9 cm equal density mass. US: 5:00 1cmfn cystic mass containing intracystic solid material 0.8 x 0.5 x 0.8 cm, corresponding to mammographic finding IMPRESSION: Right 5:00 mass corresponding to mammographic finding for which ultrasound-guided biopsy is recommended. If older outside breast imaging is made available, comparison should be requested. FOLLOW-UP: R Ultrasound guided biopsy. BI-RADS 4: Suspicious.  5/28/24 ([facility]) US-guided biopsy of RIGHT breast 5:00 1cmfn location measuring 0.8 x 0.5 x 0.6 cm (Venus marker): DCIS, intermediate nuclear grade, cribriform pattern with calcifications; ER >90% [place] >90%; malignant; concordant. RECOMMENDAITON: Breast surgical consultation for definitive management. Presurgical breast MRI would be suggested.   9/5/24Right breast, lumpectomy: -   Ductal carcinoma in situ, low to intermediate grade, cribriform type, associated calcifications, present on 5 contiguous slices (20 mm), less than 1 mm from inked inferior and medial margins, 1.5 mm from inked posterior margin and at biopsy site changes. For final margins, see below. -   Uninvolved breast shows fibrocystic changes and vascular calcifications. 2  Lateral margin clip marks, new lateral margin: -   Benign fibrofatty breast, negative for tumor. 3  Superior margin clip marks, new superior margin: -   Fibrocystic changes, negative for tumor. 4  Medial margin clip marks, new medial margin: -   Benign fibrofatty breast, negative for tumor. 5  Anterior margin clip marks, new anterior margin: -   Benign fibrofatty breast, negative for tumor. 6  Inferior margin clip marks, new inferior margin: -   Benign fibrofatty breast, negative for tumor. -   Vascular calcifications. 7  Deep margin clip marks, new deep margin: -   Ductal carcinoma in situ, similar to part 1, 4 mm in greatest dimension, present less than 1 mm from inked new margin (1 mm focus). -   Fibrocystic changes.   Decision RT sent  Patient with allergy to penicillins. Patient's BMI is 49.5  Past Medical History History of high cholesterol  History of hypertension    Menstrual Hx: has no history of hormone replacement treatment. age at menarche was 17. age at menopause 50's    Prior pregnancies: G16 and P14 . Age at live birth: 23   Fertility Treatments: no.   Birth Control Pill Use: no.   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

## 2024-09-22 NOTE — HISTORY OF PRESENT ILLNESS
[de-identified] : 74 year old Voodoo female was referred by Dr Whitney, presents with friend Rocío regarding newly diagnosed R DCIS , intermediate nuclear grade, cribriform pattern with calcifications; ER >90% MI >90%; malignant; concordant; seen as 0.8cm RIGHT breast 5:00 1cmfn mass initially seen on screening imaging; biopsied via US   4/17/24 ([facility]) B/L sMMG/US: scattered areas of fbg density. Right 6:00, retroareolar questioned mass for which diagnostic mammography and targeted sonography would be recommended. If older outside breast imaging is made available, comparison should be requested, which may obviate the need for additional imaging. FOLLOW-UP: Additional imaging. BI-RADS 0: Incomplete.  5/16/24 ([facility]) R DX MMG/US: scattered areas of fbg density. MAMMO: Right 5:00 to 6:00 2cmfn persistent 0.9 cm equal density mass. US: 5:00 1cmfn cystic mass containing intracystic solid material 0.8 x 0.5 x 0.8 cm, corresponding to mammographic finding IMPRESSION: Right 5:00 mass corresponding to mammographic finding for which ultrasound-guided biopsy is recommended. If older outside breast imaging is made available, comparison should be requested. FOLLOW-UP: R Ultrasound guided biopsy. BI-RADS 4: Suspicious.  5/28/24 ([facility]) US-guided biopsy of RIGHT breast 5:00 1cmfn location measuring 0.8 x 0.5 x 0.6 cm (Venus marker): DCIS, intermediate nuclear grade, cribriform pattern with calcifications; ER >90% MI >90%; malignant; concordant. RECOMMENDAITON: Breast surgical consultation for definitive management. Presurgical breast MRI would be suggested.   9/5/24Right breast, lumpectomy: -   Ductal carcinoma in situ, low to intermediate grade, cribriform type, associated calcifications, present on 5 contiguous slices (20 mm), less than 1 mm from inked inferior and medial margins, 1.5 mm from inked posterior margin and at biopsy site changes. For final margins, see below. -   Uninvolved breast shows fibrocystic changes and vascular calcifications. 2  Lateral margin clip marks, new lateral margin: -   Benign fibrofatty breast, negative for tumor. 3  Superior margin clip marks, new superior margin: -   Fibrocystic changes, negative for tumor. 4  Medial margin clip marks, new medial margin: -   Benign fibrofatty breast, negative for tumor. 5  Anterior margin clip marks, new anterior margin: -   Benign fibrofatty breast, negative for tumor. 6  Inferior margin clip marks, new inferior margin: -   Benign fibrofatty breast, negative for tumor. -   Vascular calcifications. 7  Deep margin clip marks, new deep margin: -   Ductal carcinoma in situ, similar to part 1, 4 mm in greatest dimension, present less than 1 mm from inked new margin (1 mm focus). -   Fibrocystic changes.   Decision RT sent  Patient with allergy to penicillins. Patient's BMI is 49.5  Past Medical History History of high cholesterol  History of hypertension    Menstrual Hx: has no history of hormone replacement treatment. age at menarche was 17. age at menopause 50's    Prior pregnancies: G16 and P14 . Age at live birth: 23   Fertility Treatments: no.   Birth Control Pill Use: no.   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

## 2024-09-22 NOTE — PHYSICAL EXAM
[Restricted in physically strenuous activity but ambulatory and able to carry out work of a light or sedentary nature] : Status 1- Restricted in physically strenuous activity but ambulatory and able to carry out work of a light or sedentary nature, e.g., light house work, office work [Obese] : obese [Normal] : affect appropriate [de-identified] : Rt WLE, Lt unremarkable, biat pendulous

## 2024-09-22 NOTE — HISTORY OF PRESENT ILLNESS
[de-identified] : 74 year old Pentecostal female was referred by Dr Whitney, presents with friend Rocío regarding newly diagnosed R DCIS , intermediate nuclear grade, cribriform pattern with calcifications; ER >90% [place] >90%; malignant; concordant; seen as 0.8cm RIGHT breast 5:00 1cmfn mass initially seen on screening imaging; biopsied via US   4/17/24 ([facility]) B/L sMMG/US: scattered areas of fbg density. Right 6:00, retroareolar questioned mass for which diagnostic mammography and targeted sonography would be recommended. If older outside breast imaging is made available, comparison should be requested, which may obviate the need for additional imaging. FOLLOW-UP: Additional imaging. BI-RADS 0: Incomplete.  5/16/24 ([facility]) R DX MMG/US: scattered areas of fbg density. MAMMO: Right 5:00 to 6:00 2cmfn persistent 0.9 cm equal density mass. US: 5:00 1cmfn cystic mass containing intracystic solid material 0.8 x 0.5 x 0.8 cm, corresponding to mammographic finding IMPRESSION: Right 5:00 mass corresponding to mammographic finding for which ultrasound-guided biopsy is recommended. If older outside breast imaging is made available, comparison should be requested. FOLLOW-UP: R Ultrasound guided biopsy. BI-RADS 4: Suspicious.  5/28/24 ([facility]) US-guided biopsy of RIGHT breast 5:00 1cmfn location measuring 0.8 x 0.5 x 0.6 cm (Venus marker): DCIS, intermediate nuclear grade, cribriform pattern with calcifications; ER >90% MN >90%; malignant; concordant. RECOMMENDAITON: Breast surgical consultation for definitive management. Presurgical breast MRI would be suggested.   9/5/24Right breast, lumpectomy: -   Ductal carcinoma in situ, low to intermediate grade, cribriform type, associated calcifications, present on 5 contiguous slices (20 mm), less than 1 mm from inked inferior and medial margins, 1.5 mm from inked posterior margin and at biopsy site changes. For final margins, see below. -   Uninvolved breast shows fibrocystic changes and vascular calcifications. 2  Lateral margin clip marks, new lateral margin: -   Benign fibrofatty breast, negative for tumor. 3  Superior margin clip marks, new superior margin: -   Fibrocystic changes, negative for tumor. 4  Medial margin clip marks, new medial margin: -   Benign fibrofatty breast, negative for tumor. 5  Anterior margin clip marks, new anterior margin: -   Benign fibrofatty breast, negative for tumor. 6  Inferior margin clip marks, new inferior margin: -   Benign fibrofatty breast, negative for tumor. -   Vascular calcifications. 7  Deep margin clip marks, new deep margin: -   Ductal carcinoma in situ, similar to part 1, 4 mm in greatest dimension, present less than 1 mm from inked new margin (1 mm focus). -   Fibrocystic changes.   Decision RT sent  Patient with allergy to penicillins. Patient's BMI is 49.5  Past Medical History History of high cholesterol  History of hypertension    Menstrual Hx: has no history of hormone replacement treatment. age at menarche was 17. age at menopause 50's    Prior pregnancies: G16 and P14 . Age at live birth: 23   Fertility Treatments: no.   Birth Control Pill Use: no.   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

## 2024-09-22 NOTE — PHYSICAL EXAM
[Restricted in physically strenuous activity but ambulatory and able to carry out work of a light or sedentary nature] : Status 1- Restricted in physically strenuous activity but ambulatory and able to carry out work of a light or sedentary nature, e.g., light house work, office work [Obese] : obese [Normal] : affect appropriate [de-identified] : Rt WLE, Lt unremarkable, biat pendulous

## 2024-09-22 NOTE — PHYSICAL EXAM
[Restricted in physically strenuous activity but ambulatory and able to carry out work of a light or sedentary nature] : Status 1- Restricted in physically strenuous activity but ambulatory and able to carry out work of a light or sedentary nature, e.g., light house work, office work [Obese] : obese [Normal] : affect appropriate [de-identified] : Rt WLE, Lt unremarkable, biat pendulous

## 2024-09-22 NOTE — HISTORY OF PRESENT ILLNESS
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 Right 6:00, retroareolar questioned mass for which diagnostic mammography and targeted sonography would be recommended. If older outside breast imaging is made available, comparison should be requested, which may obviate the need for additional imaging. FOLLOW-UP: Additional imaging. BI-RADS 0: Incomplete.  5/16/24 ([facility]) R DX MMG/US: scattered areas of fbg density. MAMMO: Right 5:00 to 6:00 2cmfn persistent 0.9 cm equal density mass. US: 5:00 1cmfn cystic mass containing intracystic solid material 0.8 x 0.5 x 0.8 cm, corresponding to mammographic finding IMPRESSION: Right 5:00 mass corresponding to mammographic finding for which ultrasound-guided biopsy is recommended. If older outside breast imaging is made available, comparison should be requested. FOLLOW-UP: R Ultrasound guided biopsy. BI-RADS 4: Suspicious.  5/28/24 ([facility]) US-guided biopsy of RIGHT breast 5:00 1cmfn location measuring 0.8 x 0.5 x 0.6 cm (Venus marker): DCIS, intermediate nuclear grade, cribriform pattern with calcifications; ER >90% NJ >90%; malignant; concordant. RECOMMENDAITON: Breast surgical consultation for definitive management. Presurgical breast MRI would be suggested.   9/5/24Right breast, lumpectomy: -   Ductal carcinoma in situ, low to intermediate grade, cribriform type, associated calcifications, present on 5 contiguous slices (20 mm), less than 1 mm from inked inferior and medial margins, 1.5 mm from inked posterior margin and at biopsy site changes. For final margins, see below. -   Uninvolved breast shows fibrocystic changes and vascular calcifications. 2  Lateral margin clip marks, new lateral margin: -   Benign fibrofatty breast, negative for tumor. 3  Superior margin clip marks, new superior margin: -   Fibrocystic changes, negative for tumor. 4  Medial margin clip marks, new medial margin: -   Benign fibrofatty breast, negative for tumor. 5  Anterior margin clip marks, new anterior margin: -   Benign fibrofatty breast, negative for tumor. 6  Inferior margin clip marks, new inferior margin: -   Benign fibrofatty breast, negative for tumor. -   Vascular calcifications. 7  Deep margin clip marks, new deep margin: -   Ductal carcinoma in situ, similar to part 1, 4 mm in greatest dimension, present less than 1 mm from inked new margin (1 mm focus). -   Fibrocystic changes.   Decision RT sent  Patient with allergy to penicillins. Patient's BMI is 49.5  Past Medical History History of high cholesterol  History of hypertension    Menstrual Hx: has no history of hormone replacement treatment. age at menarche was 17. age at menopause 50's    Prior pregnancies: G16 and P14 . Age at live birth: 23   Fertility Treatments: no.   Birth Control Pill Use: no.   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

## 2024-11-20 ENCOUNTER — NON-APPOINTMENT (OUTPATIENT)
Age: 74
End: 2024-11-20

## 2024-11-27 ENCOUNTER — NON-APPOINTMENT (OUTPATIENT)
Age: 74
End: 2024-11-27

## 2025-03-06 ENCOUNTER — NON-APPOINTMENT (OUTPATIENT)
Age: 75
End: 2025-03-06

## 2025-03-07 ENCOUNTER — NON-APPOINTMENT (OUTPATIENT)
Age: 75
End: 2025-03-07

## 2025-04-30 ENCOUNTER — APPOINTMENT (OUTPATIENT)
Dept: HEMATOLOGY ONCOLOGY | Facility: CLINIC | Age: 75
End: 2025-04-30
Payer: MEDICARE

## 2025-04-30 VITALS
SYSTOLIC BLOOD PRESSURE: 144 MMHG | OXYGEN SATURATION: 95 % | DIASTOLIC BLOOD PRESSURE: 85 MMHG | HEART RATE: 84 BPM | WEIGHT: 290 LBS | TEMPERATURE: 97.8 F | BODY MASS INDEX: 49.51 KG/M2 | RESPIRATION RATE: 18 BRPM | HEIGHT: 64 IN

## 2025-04-30 DIAGNOSIS — E66.9 OBESITY, UNSPECIFIED: ICD-10-CM

## 2025-04-30 DIAGNOSIS — I10 ESSENTIAL (PRIMARY) HYPERTENSION: ICD-10-CM

## 2025-04-30 DIAGNOSIS — D05.11 INTRADUCTAL CARCINOMA IN SITU OF RIGHT BREAST: ICD-10-CM

## 2025-04-30 DIAGNOSIS — E78.00 PURE HYPERCHOLESTEROLEMIA, UNSPECIFIED: ICD-10-CM

## 2025-04-30 PROCEDURE — 99214 OFFICE O/P EST MOD 30 MIN: CPT

## 2025-04-30 PROCEDURE — G2211 COMPLEX E/M VISIT ADD ON: CPT

## 2025-04-30 RX ORDER — LEVOTHYROXINE SODIUM 0.17 MG/1
175 TABLET ORAL
Refills: 0 | Status: ACTIVE | COMMUNITY
Start: 2025-04-30

## 2025-04-30 RX ORDER — TAMOXIFEN CITRATE 10 MG/1
10 TABLET, FILM COATED ORAL DAILY
Qty: 90 | Refills: 0 | Status: ACTIVE | COMMUNITY
Start: 2025-04-30 | End: 1900-01-01

## 2025-05-03 ENCOUNTER — TRANSCRIPTION ENCOUNTER (OUTPATIENT)
Age: 75
End: 2025-05-03

## 2025-05-19 ENCOUNTER — APPOINTMENT (OUTPATIENT)
Dept: MAMMOGRAPHY | Facility: CLINIC | Age: 75
End: 2025-05-19

## 2025-05-19 ENCOUNTER — RESULT REVIEW (OUTPATIENT)
Age: 75
End: 2025-05-19

## 2025-05-19 ENCOUNTER — APPOINTMENT (OUTPATIENT)
Dept: RADIOLOGY | Facility: CLINIC | Age: 75
End: 2025-05-19
Payer: MEDICARE

## 2025-05-19 ENCOUNTER — APPOINTMENT (OUTPATIENT)
Dept: ULTRASOUND IMAGING | Facility: CLINIC | Age: 75
End: 2025-05-19
Payer: MEDICARE

## 2025-05-19 PROCEDURE — 77066 DX MAMMO INCL CAD BI: CPT

## 2025-05-19 PROCEDURE — 77085 DXA BONE DENSITY AXL VRT FX: CPT

## 2025-05-19 PROCEDURE — 76641 ULTRASOUND BREAST COMPLETE: CPT | Mod: 50

## 2025-05-19 PROCEDURE — G0279: CPT

## 2025-05-20 ENCOUNTER — NON-APPOINTMENT (OUTPATIENT)
Age: 75
End: 2025-05-20

## 2025-06-10 ENCOUNTER — TRANSCRIPTION ENCOUNTER (OUTPATIENT)
Age: 75
End: 2025-06-10

## 2025-07-21 NOTE — PAST MEDICAL HISTORY
[Menarche Age ____] : age at menarche was [unfilled] [Menopause Age____] : age at menopause was [unfilled] [Total Preg ___] : G[unfilled] [Live Births ___] : P[unfilled]  [Age At Live Birth ___] : Age at live birth: [unfilled] Her/She [History of Hormone Replacement Treatment] : has no history of hormone replacement treatment [FreeTextEntry6] : no [FreeTextEntry7] : no [FreeTextEntry8] : yes

## (undated) DEVICE — WARMING BLANKET LOWER ADULT

## (undated) DEVICE — VENODYNE/SCD SLEEVE CALF MEDIUM

## (undated) DEVICE — SHEARS HARMONIC FOCUS CURVED SHEARS 9CM

## (undated) DEVICE — SYR LUER LOK 50CC

## (undated) DEVICE — SUT VICRYL 4-0 18" PS-2 UNDYED

## (undated) DEVICE — MARKING PEN W RULER

## (undated) DEVICE — DRSG STERISTRIPS 0.5 X 4"

## (undated) DEVICE — GLV 6.5 PROTEXIS (WHITE)

## (undated) DEVICE — SUT SILK 2-0 30" PSL

## (undated) DEVICE — Device

## (undated) DEVICE — PACK GENERAL MINOR

## (undated) DEVICE — POSITIONER FOAM EGG CRATE ULNAR 2PCS (PINK)

## (undated) DEVICE — SUT MONOCRYL 5-0 18" PC-3 UNDYED

## (undated) DEVICE — SUT VICRYL PLUS 4-0 27" SH UNDYED

## (undated) DEVICE — DRAPE PROBE COVER LATEX FREE 3X96"

## (undated) DEVICE — SUT VICRYL PLUS 2-0 27" SH UNDYED